# Patient Record
Sex: FEMALE | Race: WHITE | Employment: OTHER | URBAN - METROPOLITAN AREA
[De-identification: names, ages, dates, MRNs, and addresses within clinical notes are randomized per-mention and may not be internally consistent; named-entity substitution may affect disease eponyms.]

---

## 2017-12-05 PROBLEM — O09.523 ELDERLY MULTIGRAVIDA IN THIRD TRIMESTER: Status: ACTIVE | Noted: 2017-12-05

## 2017-12-21 ENCOUNTER — TELEPHONE (OUTPATIENT)
Dept: PRIMARY CARE CLINIC | Age: 37
End: 2017-12-21

## 2018-02-09 RX ORDER — VALACYCLOVIR HYDROCHLORIDE 1 G/1
1000 TABLET, FILM COATED ORAL DAILY
Qty: 30 TABLET | Refills: 12 | Status: ON HOLD | OUTPATIENT
Start: 2018-02-09 | End: 2022-01-14

## 2018-11-06 LAB
C. TRACHOMATIS, EXTERNAL RESULT: NEGATIVE
N. GONORRHOEAE, EXTERNAL RESULT: NEGATIVE

## 2018-12-04 LAB
ABO, EXTERNAL RESULT: NORMAL
HEP B, EXTERNAL RESULT: NEGATIVE
HIV, EXTERNAL RESULT: NORMAL
RHOGAM, EXTERNAL RESULT: POSITIVE
RPR, EXTERNAL RESULT: NEGATIVE
RUBELLA TITER, EXTERNAL RESULT: NORMAL

## 2019-05-14 LAB — GBS, EXTERNAL RESULT: NEGATIVE

## 2019-06-11 ENCOUNTER — HOSPITAL ENCOUNTER (INPATIENT)
Age: 39
LOS: 1 days | Discharge: HOME | End: 2019-06-13
Attending: OBSTETRICS & GYNECOLOGY | Admitting: OBSTETRICS & GYNECOLOGY

## 2019-06-11 ENCOUNTER — APPOINTMENT (OUTPATIENT)
Dept: LABOR AND DELIVERY | Age: 39
End: 2019-06-11

## 2019-06-11 LAB
AMPHETAMINE SCREEN, URINE: NORMAL
BARBITURATE SCREEN URINE: NORMAL
BASOPHILS ABSOLUTE: 0 K/UL (ref 0–0.2)
BASOPHILS RELATIVE PERCENT: 0.3 %
BENZODIAZEPINE SCREEN, URINE: NORMAL
BUPRENORPHINE URINE: NORMAL
CANNABINOID SCREEN URINE: NORMAL
COCAINE METABOLITE SCREEN URINE: NORMAL
EOSINOPHILS ABSOLUTE: 0.1 K/UL (ref 0–0.6)
EOSINOPHILS RELATIVE PERCENT: 0.5 %
HCT VFR BLD CALC: 36.9 % (ref 36–48)
HEMOGLOBIN: 12.7 G/DL (ref 12–16)
LYMPHOCYTES ABSOLUTE: 1.8 K/UL (ref 1–5.1)
LYMPHOCYTES RELATIVE PERCENT: 14.3 %
Lab: NORMAL
MCH RBC QN AUTO: 30.7 PG (ref 26–34)
MCHC RBC AUTO-ENTMCNC: 34.4 G/DL (ref 31–36)
MCV RBC AUTO: 89.3 FL (ref 80–100)
METHADONE SCREEN, URINE: NORMAL
MONOCYTES ABSOLUTE: 0.8 K/UL (ref 0–1.3)
MONOCYTES RELATIVE PERCENT: 6.3 %
NEUTROPHILS ABSOLUTE: 10 K/UL (ref 1.7–7.7)
NEUTROPHILS RELATIVE PERCENT: 78.6 %
OPIATE SCREEN URINE: NORMAL
OXYCODONE URINE: NORMAL
PDW BLD-RTO: 15.1 % (ref 12.4–15.4)
PH UA: 7
PHENCYCLIDINE SCREEN URINE: NORMAL
PLATELET # BLD: 180 K/UL (ref 135–450)
PMV BLD AUTO: 10.4 FL (ref 5–10.5)
PROPOXYPHENE SCREEN: NORMAL
RBC # BLD: 4.13 M/UL (ref 4–5.2)
SPECIMEN STATUS: NORMAL
WBC # BLD: 12.7 K/UL (ref 4–11)

## 2019-06-11 PROCEDURE — 86780 TREPONEMA PALLIDUM: CPT

## 2019-06-11 PROCEDURE — 80307 DRUG TEST PRSMV CHEM ANLYZR: CPT

## 2019-06-11 PROCEDURE — 6370000000 HC RX 637 (ALT 250 FOR IP): Performed by: OBSTETRICS & GYNECOLOGY

## 2019-06-11 PROCEDURE — 85025 COMPLETE CBC W/AUTO DIFF WBC: CPT

## 2019-06-11 RX ORDER — SODIUM CHLORIDE, SODIUM LACTATE, POTASSIUM CHLORIDE, CALCIUM CHLORIDE 600; 310; 30; 20 MG/100ML; MG/100ML; MG/100ML; MG/100ML
INJECTION, SOLUTION INTRAVENOUS CONTINUOUS
Status: DISCONTINUED | OUTPATIENT
Start: 2019-06-11 | End: 2019-06-12

## 2019-06-11 RX ORDER — DIPHENHYDRAMINE HYDROCHLORIDE 50 MG/ML
25 INJECTION INTRAMUSCULAR; INTRAVENOUS EVERY 4 HOURS PRN
Status: DISCONTINUED | OUTPATIENT
Start: 2019-06-11 | End: 2019-06-12

## 2019-06-11 RX ORDER — ONDANSETRON 2 MG/ML
4 INJECTION INTRAMUSCULAR; INTRAVENOUS EVERY 6 HOURS PRN
Status: DISCONTINUED | OUTPATIENT
Start: 2019-06-11 | End: 2019-06-12

## 2019-06-11 RX ORDER — SODIUM CHLORIDE 0.9 % (FLUSH) 0.9 %
10 SYRINGE (ML) INJECTION PRN
Status: DISCONTINUED | OUTPATIENT
Start: 2019-06-11 | End: 2019-06-12

## 2019-06-11 RX ORDER — SODIUM CHLORIDE 0.9 % (FLUSH) 0.9 %
10 SYRINGE (ML) INJECTION EVERY 12 HOURS SCHEDULED
Status: DISCONTINUED | OUTPATIENT
Start: 2019-06-11 | End: 2019-06-12

## 2019-06-11 RX ORDER — LIDOCAINE HYDROCHLORIDE 10 MG/ML
30 INJECTION, SOLUTION EPIDURAL; INFILTRATION; INTRACAUDAL; PERINEURAL PRN
Status: DISCONTINUED | OUTPATIENT
Start: 2019-06-11 | End: 2019-06-12

## 2019-06-11 RX ADMIN — DINOPROSTONE 10 MG: 10 INSERT VAGINAL at 19:39

## 2019-06-12 ENCOUNTER — ANESTHESIA (OUTPATIENT)
Dept: LABOR AND DELIVERY | Age: 39
End: 2019-06-12

## 2019-06-12 ENCOUNTER — ANESTHESIA EVENT (OUTPATIENT)
Dept: LABOR AND DELIVERY | Age: 39
End: 2019-06-12

## 2019-06-12 PROBLEM — Z3A.39 39 WEEKS GESTATION OF PREGNANCY: Status: ACTIVE | Noted: 2019-06-12

## 2019-06-12 PROBLEM — Z34.90 TERM PREGNANCY: Status: ACTIVE | Noted: 2019-06-12

## 2019-06-12 LAB — TOTAL SYPHILLIS IGG/IGM: NORMAL

## 2019-06-12 PROCEDURE — 6370000000 HC RX 637 (ALT 250 FOR IP): Performed by: OBSTETRICS & GYNECOLOGY

## 2019-06-12 PROCEDURE — 1220000000 HC SEMI PRIVATE OB R&B

## 2019-06-12 PROCEDURE — 3700000025 EPIDURAL BLOCK: Performed by: ANESTHESIOLOGY

## 2019-06-12 PROCEDURE — 3E0P7VZ INTRODUCTION OF HORMONE INTO FEMALE REPRODUCTIVE, VIA NATURAL OR ARTIFICIAL OPENING: ICD-10-PCS | Performed by: OBSTETRICS & GYNECOLOGY

## 2019-06-12 PROCEDURE — 2580000003 HC RX 258: Performed by: OBSTETRICS & GYNECOLOGY

## 2019-06-12 PROCEDURE — 2500000003 HC RX 250 WO HCPCS: Performed by: NURSE ANESTHETIST, CERTIFIED REGISTERED

## 2019-06-12 PROCEDURE — 51701 INSERT BLADDER CATHETER: CPT

## 2019-06-12 PROCEDURE — 7200000001 HC VAGINAL DELIVERY

## 2019-06-12 RX ORDER — KETOROLAC TROMETHAMINE 30 MG/ML
30 INJECTION, SOLUTION INTRAMUSCULAR; INTRAVENOUS EVERY 6 HOURS
Status: ACTIVE | OUTPATIENT
Start: 2019-06-12 | End: 2019-06-12

## 2019-06-12 RX ORDER — ACETAMINOPHEN 325 MG/1
650 TABLET ORAL EVERY 4 HOURS PRN
Status: DISCONTINUED | OUTPATIENT
Start: 2019-06-12 | End: 2019-06-13 | Stop reason: HOSPADM

## 2019-06-12 RX ORDER — LANOLIN 100 %
OINTMENT (GRAM) TOPICAL PRN
Status: DISCONTINUED | OUTPATIENT
Start: 2019-06-12 | End: 2019-06-13 | Stop reason: HOSPADM

## 2019-06-12 RX ORDER — SODIUM CHLORIDE, SODIUM LACTATE, POTASSIUM CHLORIDE, CALCIUM CHLORIDE 600; 310; 30; 20 MG/100ML; MG/100ML; MG/100ML; MG/100ML
INJECTION, SOLUTION INTRAVENOUS CONTINUOUS
Status: DISCONTINUED | OUTPATIENT
Start: 2019-06-12 | End: 2019-06-13 | Stop reason: HOSPADM

## 2019-06-12 RX ORDER — DOCUSATE SODIUM 100 MG/1
100 CAPSULE, LIQUID FILLED ORAL 2 TIMES DAILY PRN
Status: DISCONTINUED | OUTPATIENT
Start: 2019-06-12 | End: 2019-06-13 | Stop reason: HOSPADM

## 2019-06-12 RX ORDER — BUPIVACAINE HYDROCHLORIDE 2.5 MG/ML
INJECTION, SOLUTION EPIDURAL; INFILTRATION; INTRACAUDAL PRN
Status: DISCONTINUED | OUTPATIENT
Start: 2019-06-12 | End: 2019-06-13 | Stop reason: SDUPTHER

## 2019-06-12 RX ORDER — ONDANSETRON 2 MG/ML
4 INJECTION INTRAMUSCULAR; INTRAVENOUS EVERY 6 HOURS PRN
Status: DISCONTINUED | OUTPATIENT
Start: 2019-06-12 | End: 2019-06-13 | Stop reason: HOSPADM

## 2019-06-12 RX ORDER — IBUPROFEN 800 MG/1
800 TABLET ORAL EVERY 8 HOURS PRN
Status: DISCONTINUED | OUTPATIENT
Start: 2019-06-12 | End: 2019-06-13 | Stop reason: HOSPADM

## 2019-06-12 RX ORDER — SODIUM CHLORIDE 0.9 % (FLUSH) 0.9 %
10 SYRINGE (ML) INJECTION PRN
Status: DISCONTINUED | OUTPATIENT
Start: 2019-06-12 | End: 2019-06-13 | Stop reason: HOSPADM

## 2019-06-12 RX ORDER — FERROUS SULFATE 325(65) MG
325 TABLET ORAL 2 TIMES DAILY WITH MEALS
Status: DISCONTINUED | OUTPATIENT
Start: 2019-06-12 | End: 2019-06-13 | Stop reason: HOSPADM

## 2019-06-12 RX ORDER — SIMETHICONE 80 MG
80 TABLET,CHEWABLE ORAL EVERY 6 HOURS PRN
Status: DISCONTINUED | OUTPATIENT
Start: 2019-06-12 | End: 2019-06-13 | Stop reason: HOSPADM

## 2019-06-12 RX ORDER — SODIUM CHLORIDE 0.9 % (FLUSH) 0.9 %
10 SYRINGE (ML) INJECTION EVERY 12 HOURS SCHEDULED
Status: DISCONTINUED | OUTPATIENT
Start: 2019-06-12 | End: 2019-06-13 | Stop reason: HOSPADM

## 2019-06-12 RX ADMIN — BUPIVACAINE HYDROCHLORIDE 8 ML: 2.5 INJECTION, SOLUTION EPIDURAL; INFILTRATION; INTRACAUDAL; PERINEURAL at 05:07

## 2019-06-12 RX ADMIN — SODIUM CHLORIDE, POTASSIUM CHLORIDE, SODIUM LACTATE AND CALCIUM CHLORIDE: 600; 310; 30; 20 INJECTION, SOLUTION INTRAVENOUS at 05:23

## 2019-06-12 RX ADMIN — WITCH HAZEL 40 EACH: 500 SOLUTION RECTAL; TOPICAL at 14:43

## 2019-06-12 RX ADMIN — IBUPROFEN 800 MG: 800 TABLET, FILM COATED ORAL at 20:10

## 2019-06-12 RX ADMIN — Medication 15 ML/HR: at 05:12

## 2019-06-12 RX ADMIN — BENZOCAINE AND LEVOMENTHOL: 200; 5 SPRAY TOPICAL at 14:43

## 2019-06-12 RX ADMIN — DOCUSATE SODIUM 100 MG: 100 CAPSULE, LIQUID FILLED ORAL at 17:01

## 2019-06-12 RX ADMIN — HYDROCORTISONE: 25 CREAM TOPICAL at 14:43

## 2019-06-12 ASSESSMENT — PAIN SCALES - GENERAL: PAINLEVEL_OUTOF10: 2

## 2019-06-12 NOTE — H&P
Department of Obstetrics and Gynecology  Labor and Delivery   Attending Progress Note      SUBJECTIVE:  Patient admitted for induction of labor      OBJECTIVE:      Fetal heart rate:       Baseline Heart Rate:  130        Accelerations:  present       Long Term Variability:  moderate       Decelerations:  absent         Contraction frequency: 3 minutes    Membranes:  Intact    Cervix:         Dilation:  8 cm         Effacement:  80%         Station:  0         Position:  mid             ASSESSMENT & PLAN:    43 y.o.    OB History        8    Para   5    Term   5       0    AB   2    Living   5       SAB   2    TAB   0    Ectopic   0    Molar   0    Multiple   0    Live Births   5             39w6d  1. FWB: reassuring  2. Cervidil pulled at 4:40 am.  Continued with cervical change  3. ACOG reviewed. GBS -, A+, surrogate.

## 2019-06-12 NOTE — ANESTHESIA PROCEDURE NOTES
Epidural Block    Patient location during procedure: OB  Reason for block: labor epidural  Staffing  Resident/CRNA: Jonathan Guerrero, APRN - CRNA  Preanesthetic Checklist  Completed: patient identified, pre-op evaluation, timeout performed, IV checked, risks and benefits discussed, monitors and equipment checked, anesthesia consent given, oxygen available and patient being monitored  Epidural  Patient position: sitting  Prep: ChloraPrep  Patient monitoring: continuous pulse ox  Approach: midline  Location: lumbar (1-5)  Injection technique: JAXON saline  Provider prep: mask  Needle  Needle type: Tuohy   Needle gauge: 17 G  Needle length: 3.5 in  Needle insertion depth: 4 cm  Catheter type: side hole  Catheter size: 19 G  Catheter at skin depth: 10 cm  Test dose: negative  Assessment  Sensory level: T8  Hemodynamics: stable  Attempts: 1  Additional Notes  Pt prepped and draped in sterile fashion. Skin wheal with 1% lidocaine. JAXON with 3 cc NS, 25g spinal needle inserted per lila. Clear CSF visualized in hub. Needle withdrawn and catheter threaded. Negative test dose 3cc 1.5% Lidocaine with Epinephrine. Sterile dressing applied.

## 2019-06-12 NOTE — PROGRESS NOTES
Department of Obstetrics and Gynecology  Labor and Delivery   Attending Progress Note      SUBJECTIVE:  Patient comfortable with epidural s/p SROM    OBJECTIVE:      Fetal heart rate: Cat 1            Contraction frequency: 3 minutes    Membranes:  Ruptured clear fluid    Cervix:         Dilation:  Anterior lip         Effacement:  100%         Station:  -2         Position:  mid             ASSESSMENT & PLAN:    43 y.o.    OB History        8    Para   5    Term   5       0    AB   2    Living   5       SAB   2    TAB   0    Ectopic   0    Molar   0    Multiple   0    Live Births   5             39w6d  1. FWB: reassuring  2. Expect . Will allow to labor down.

## 2019-06-12 NOTE — PROGRESS NOTES
Called Dr. Toya Fiore to update that patient is uncomfortable with ctx and last vaginal exam was 2/50/-2

## 2019-06-12 NOTE — PLAN OF CARE
Problem: Anxiety:  Goal: Level of anxiety will decrease  Description  Level of anxiety will decrease  Outcome: Ongoing     Problem: Breathing Pattern - Ineffective:  Goal: Able to breathe comfortably  Description  Able to breathe comfortably  Outcome: Ongoing     Problem: Fluid Volume - Imbalance:  Goal: Absence of imbalanced fluid volume signs and symptoms  Description  Absence of imbalanced fluid volume signs and symptoms  Outcome: Ongoing  Goal: Absence of intrapartum hemorrhage signs and symptoms  Description  Absence of intrapartum hemorrhage signs and symptoms  Outcome: Ongoing     Problem: Infection - Intrapartum Infection:  Goal: Will show no infection signs and symptoms  Description  Will show no infection signs and symptoms  Outcome: Ongoing     Problem: Labor Process - Prolonged:  Goal: Labor progression, first stage, within specified pattern  Description  Labor progression, first stage, within specified pattern  Outcome: Ongoing  Goal: Labor progession, second stage, within specified pattern  Description  Labor progession, second stage, within specified pattern  Outcome: Ongoing  Goal: Uterine contractions within specified parameters  Description  Uterine contractions within specified parameters  Outcome: Ongoing     Problem:  Screening:  Goal: Ability to make informed decisions regarding treatment has improved  Description  Ability to make informed decisions regarding treatment has improved  Outcome: Ongoing     Problem: Pain - Acute:  Goal: Pain level will decrease  Description  Pain level will decrease  Outcome: Ongoing  Goal: Able to cope with pain  Description  Able to cope with pain  Outcome: Ongoing     Problem: Tissue Perfusion - Uteroplacental, Altered:  Goal: Absence of abnormal fetal heart rate pattern  Description  Absence of abnormal fetal heart rate pattern  Outcome: Ongoing     Problem: Urinary Retention:  Goal: Experiences of bladder distention will

## 2019-06-12 NOTE — ANESTHESIA PRE PROCEDURE
no meds    ASCUS on Pap smear 6/08    nl 12/08    IBS (irritable bowel syndrome)     Postpartum depression     last two pregnancies       Past Surgical History:        Procedure Laterality Date    DILATION AND CURETTAGE OF UTERUS  6/06    TONSILLECTOMY  1 y/o    WISDOM TOOTH EXTRACTION         Social History:    Social History     Tobacco Use    Smoking status: Never Smoker    Smokeless tobacco: Never Used   Substance Use Topics    Alcohol use: No                                Counseling given: Not Answered      Vital Signs (Current):   Vitals:    06/11/19 1856 06/11/19 1950 06/11/19 2339 06/12/19 0207   BP: 115/77 136/77 (!) 145/90 123/75   Pulse: 76 65 68 75   Resp: 16 16 16 16   Temp: 37.2 °C (98.9 °F) 36.9 °C (98.4 °F) 36.8 °C (98.2 °F) 36.8 °C (98.2 °F)   TempSrc: Oral Oral Oral Oral   Weight: 154 lb (69.9 kg)      Height: 5' (1.524 m)                                                 BP Readings from Last 3 Encounters:   06/12/19 123/75   12/07/17 114/64   09/23/16 106/60       NPO Status: Time of last liquid consumption: 1500                        Time of last solid consumption: 1500                        Date of last liquid consumption: 06/11/19                        Date of last solid food consumption: 06/11/19    BMI:   Wt Readings from Last 3 Encounters:   06/11/19 154 lb (69.9 kg)   12/04/17 152 lb (68.9 kg)   09/23/16 107 lb (48.5 kg)     Body mass index is 30.08 kg/m².     CBC:   Lab Results   Component Value Date    WBC 12.7 06/11/2019    RBC 4.13 06/11/2019    HGB 12.7 06/11/2019    HCT 36.9 06/11/2019    MCV 89.3 06/11/2019    RDW 15.1 06/11/2019     06/11/2019       CMP:   Lab Results   Component Value Date     01/18/2012    K 4.4 01/18/2012     01/18/2012    CO2 22 01/18/2012    BUN 15 01/18/2012    CREATININE 0.7 01/18/2012    GFRAA >60 01/18/2012    GLUCOSE 89 01/18/2012    CALCIUM 9.1 01/18/2012       POC Tests: No results for input(s): POCGLU, POCNA, POCK, POCCL, Asa He, POCHCT in the last 72 hours. Coags: No results found for: PROTIME, INR, APTT    HCG (If Applicable): No results found for: PREGTESTUR, PREGSERUM, HCG, HCGQUANT     ABGs: No results found for: PHART, PO2ART, CDB3AZR, QQM0WIA, BEART, Q4CRVUQI     Type & Screen (If Applicable):  No results found for: LABABO, [address]    Anesthesia Evaluation  Patient summary reviewed and Nursing notes reviewed no history of anesthetic complications:   Airway: Mallampati: II     Neck ROM: full   Dental: normal exam         Pulmonary:Negative Pulmonary ROS and normal exam                               Cardiovascular:Negative CV ROS                      Neuro/Psych:   Negative Neuro/Psych ROS  (+) depression/anxiety             GI/Hepatic/Renal: Neg GI/Hepatic/Renal ROS           ROS comment: IBS. Endo/Other: Negative Endo/Other ROS                    Abdominal:           Vascular:                                        Anesthesia Plan      epidural     ASA 2 - emergent     (I discussed with the patient the risks and benefits of labor epidural placement. All questions were answered the patient agrees with the plan. Recent Vitals:  ---------------------------               06/12/19                      0207         ---------------------------   BP:          123/75         Pulse:         75           Resp:          16           Temp:   36.8 °C (98.2 °F)  ---------------------------  Body mass index is 30.08 kg/m².     Social History    Tobacco Use      Smoking status: Never Smoker      Smokeless tobacco: Never Used      LABS:    CBC  Lab Results       Component                Value               Date/Time                  WBC                      12.7 (H)            06/11/2019 07:00 PM        HGB                      12.7                06/11/2019 07:00 PM        HCT                      36.9                06/11/2019 07:00 PM        PLT                      180                 06/11/2019 07:00 PM   RENAL  Lab Results       Component                Value               Date/Time                  NA                       140                 01/18/2012 11:10 AM        K                        4.4                 01/18/2012 11:10 AM        CL                       108                 01/18/2012 11:10 AM        CO2                      22                  01/18/2012 11:10 AM        BUN                      15                  01/18/2012 11:10 AM        CREATININE               0.7                 01/18/2012 11:10 AM        GLUCOSE                  89                  01/18/2012 11:10 AM   COAGS  No results found for: PROTIME, INR, APTT)        Anesthetic plan and risks discussed with patient.                       AARON Andrade - CRNA   6/12/2019

## 2019-06-12 NOTE — BRIEF OP NOTE
Brief Postoperative Note  ______________________________________________________________    Patient: Wilbur Cabral  YOB: 1980  MRN: [id number]  Date of Procedure:     Surrogate pregnancy. AMA  IOL  , VMI  Apgars 8/9  No epis/lac. Placenta spon.   cc      Adalid Landry MD  Date: 2019  Time: 10:30 AM

## 2019-06-13 VITALS
BODY MASS INDEX: 30.23 KG/M2 | HEART RATE: 67 BPM | WEIGHT: 154 LBS | TEMPERATURE: 96.7 F | HEIGHT: 60 IN | RESPIRATION RATE: 16 BRPM | SYSTOLIC BLOOD PRESSURE: 143 MMHG | DIASTOLIC BLOOD PRESSURE: 80 MMHG

## 2019-06-13 LAB
HCT VFR BLD CALC: 33.9 % (ref 36–48)
HEMOGLOBIN: 11.6 G/DL (ref 12–16)
MCH RBC QN AUTO: 31.1 PG (ref 26–34)
MCHC RBC AUTO-ENTMCNC: 34.2 G/DL (ref 31–36)
MCV RBC AUTO: 90.8 FL (ref 80–100)
PDW BLD-RTO: 15.4 % (ref 12.4–15.4)
PLATELET # BLD: 131 K/UL (ref 135–450)
PMV BLD AUTO: 10.2 FL (ref 5–10.5)
RBC # BLD: 3.74 M/UL (ref 4–5.2)
WBC # BLD: 11.4 K/UL (ref 4–11)

## 2019-06-13 PROCEDURE — 36415 COLL VENOUS BLD VENIPUNCTURE: CPT

## 2019-06-13 PROCEDURE — 85027 COMPLETE CBC AUTOMATED: CPT

## 2019-06-13 PROCEDURE — 6370000000 HC RX 637 (ALT 250 FOR IP): Performed by: OBSTETRICS & GYNECOLOGY

## 2019-06-13 RX ORDER — IBUPROFEN 800 MG/1
800 TABLET ORAL EVERY 8 HOURS PRN
Qty: 120 TABLET | Refills: 3 | Status: ON HOLD | COMMUNITY
Start: 2019-06-13 | End: 2022-01-14

## 2019-06-13 RX ADMIN — HYDROCORTISONE: 25 CREAM TOPICAL at 09:36

## 2019-06-13 RX ADMIN — IBUPROFEN 800 MG: 800 TABLET, FILM COATED ORAL at 09:36

## 2019-06-13 RX ADMIN — WITCH HAZEL 40 EACH: 500 SOLUTION RECTAL; TOPICAL at 09:36

## 2019-06-13 RX ADMIN — DOCUSATE SODIUM 100 MG: 100 CAPSULE, LIQUID FILLED ORAL at 09:37

## 2019-06-13 RX ADMIN — BENZOCAINE AND LEVOMENTHOL: 200; 5 SPRAY TOPICAL at 09:36

## 2019-06-13 ASSESSMENT — PAIN SCALES - GENERAL: PAINLEVEL_OUTOF10: 1

## 2019-06-13 NOTE — FLOWSHEET NOTE
Discharge Phone Call Log  Patient Name: Altamese Brain     [facility] Care Provider: Houston Reyes MD Discharge Date: 6/13/2019    Disposition of baby:  Vale Steiner  Phone Number: [phone] (home)     Attempts to Contact:  Date:    Nurse  Date:    Nurse  Date:    Nurse    Introduce yourself and explain the questionnaire. 1.  Now that you are at home is your pain being well controlled? Y/N   What pain reducing measures are you using? ____________________________________      This patient has no babies on file. 2. Are you having any infant feeding issues? Y/N _____________________________      If breastfeeding, were you satisfied with the breastfeeding support services offered? Y/N  3. Any engorgement problems? Y/N  Have you had to supplement? Y/N  4. Have you made or have you already had your first appointment with the baby's doctor? Y/N If no, do you know when to schedule it? Y/N Do you have a safe crib, bassinet or play yard with a firm mattress for your infant to sleep in at home? Y/N  5. Have you scheduled your follow-up appointment? Y/N  If no, do you know when to schedule it? Y/N  6. Did your nurses and physicians include you in the plan of care, communicating with      you respectfully, and in a manner easy to understand? Y/N       Comments:  ___________________________________________________________  7. Is there anyone in particular you would like to mention who provided care for you?          ____________________________    8. Do you have any questions about your discharge instructions or the notebook? Y/N    9. Did your discharge occur in a timely manner? Y/N        Comments: __________________________________________________________    Remember to describe the hospital survey and ask patient to return it when possible.   Questions During Interview:__________________________________________________________  Teaching During interview :___________________________________________________________  ___________________________RN       Date:______________Time:________________

## 2019-06-18 NOTE — ANESTHESIA POSTPROCEDURE EVALUATION
Department of Anesthesiology  Postprocedure Note    Patient: Cleophus Cabot  MRN: [id number]  YOB: 1980  Date of evaluation: 6/18/2019  Time:  7:35 AM     Procedure Summary     Date:  06/12/19 Room / Location:      Anesthesia Start:  0454 Anesthesia Stop:  1025    Procedure:  Labor Analgesia Diagnosis:      Scheduled Providers:   Responsible Provider:  Eddie Hathaway MD    Anesthesia Type:  epidural ASA Status:  2 - Emergent          Anesthesia Type: epidural    Joby Phase I:      Joby Phase II:      Last vitals: Reviewed and per EMR flowsheets. Anesthesia Post Evaluation    Comments: Pt has already been discharged.   Upon review of chart it appears that there are no apparent anesthesia complications

## 2021-12-06 ENCOUNTER — HOSPITAL ENCOUNTER (OUTPATIENT)
Dept: ULTRASOUND IMAGING | Age: 41
Discharge: HOME | End: 2021-12-06
Payer: COMMERCIAL

## 2021-12-06 DIAGNOSIS — O09.523 MULTIGRAVIDA OF ADVANCED MATERNAL AGE IN THIRD TRIMESTER: ICD-10-CM

## 2021-12-06 PROCEDURE — 76819 FETAL BIOPHYS PROFIL W/O NST: CPT

## 2022-01-10 ENCOUNTER — HOSPITAL ENCOUNTER (OUTPATIENT)
Dept: ULTRASOUND IMAGING | Age: 42
Discharge: HOME | End: 2022-01-10
Payer: COMMERCIAL

## 2022-01-10 ENCOUNTER — HOSPITAL ENCOUNTER (OUTPATIENT)
Age: 42
Discharge: HOME | End: 2022-01-10
Payer: COMMERCIAL

## 2022-01-10 DIAGNOSIS — O09.529 ANTEPARTUM MULTIGRAVIDA OF ADVANCED MATERNAL AGE: ICD-10-CM

## 2022-01-10 LAB — SARS-COV-2: DETECTED

## 2022-01-10 PROCEDURE — U0005 INFEC AGEN DETEC AMPLI PROBE: HCPCS

## 2022-01-10 PROCEDURE — U0003 INFECTIOUS AGENT DETECTION BY NUCLEIC ACID (DNA OR RNA); SEVERE ACUTE RESPIRATORY SYNDROME CORONAVIRUS 2 (SARS-COV-2) (CORONAVIRUS DISEASE [COVID-19]), AMPLIFIED PROBE TECHNIQUE, MAKING USE OF HIGH THROUGHPUT TECHNOLOGIES AS DESCRIBED BY CMS-2020-01-R: HCPCS

## 2022-01-10 PROCEDURE — 76819 FETAL BIOPHYS PROFIL W/O NST: CPT

## 2022-01-14 ENCOUNTER — HOSPITAL ENCOUNTER (INPATIENT)
Age: 42
LOS: 1 days | Discharge: HOME | End: 2022-01-15
Attending: OBSTETRICS & GYNECOLOGY | Admitting: OBSTETRICS & GYNECOLOGY
Payer: COMMERCIAL

## 2022-01-14 ENCOUNTER — APPOINTMENT (OUTPATIENT)
Dept: LABOR AND DELIVERY | Age: 42
End: 2022-01-14
Payer: COMMERCIAL

## 2022-01-14 ENCOUNTER — ANESTHESIA EVENT (OUTPATIENT)
Dept: LABOR AND DELIVERY | Age: 42
End: 2022-01-14
Payer: COMMERCIAL

## 2022-01-14 ENCOUNTER — ANESTHESIA (OUTPATIENT)
Dept: LABOR AND DELIVERY | Age: 42
End: 2022-01-14
Payer: COMMERCIAL

## 2022-01-14 PROBLEM — Z37.9 NORMAL LABOR: Status: ACTIVE | Noted: 2022-01-14

## 2022-01-14 LAB
ABO/RH: NORMAL
AMPHETAMINE SCREEN, URINE: NORMAL
ANTIBODY SCREEN: NORMAL
BARBITURATE SCREEN URINE: NORMAL
BASOPHILS ABSOLUTE: 0 K/UL (ref 0–0.2)
BASOPHILS RELATIVE PERCENT: 0.5 %
BENZODIAZEPINE SCREEN, URINE: NORMAL
BUPRENORPHINE URINE: NORMAL
CANNABINOID SCREEN URINE: NORMAL
COCAINE METABOLITE SCREEN URINE: NORMAL
EOSINOPHILS ABSOLUTE: 0.1 K/UL (ref 0–0.6)
EOSINOPHILS RELATIVE PERCENT: 0.9 %
HCT VFR BLD CALC: 34.6 % (ref 36–48)
HEMOGLOBIN: 11.5 G/DL (ref 12–16)
LYMPHOCYTES ABSOLUTE: 2.2 K/UL (ref 1–5.1)
LYMPHOCYTES RELATIVE PERCENT: 27.1 %
Lab: NORMAL
MCH RBC QN AUTO: 27.9 PG (ref 26–34)
MCHC RBC AUTO-ENTMCNC: 33.1 G/DL (ref 31–36)
MCV RBC AUTO: 84 FL (ref 80–100)
METHADONE SCREEN, URINE: NORMAL
MONOCYTES ABSOLUTE: 0.6 K/UL (ref 0–1.3)
MONOCYTES RELATIVE PERCENT: 7 %
NEUTROPHILS ABSOLUTE: 5.2 K/UL (ref 1.7–7.7)
NEUTROPHILS RELATIVE PERCENT: 64.5 %
OPIATE SCREEN URINE: NORMAL
OXYCODONE URINE: NORMAL
PDW BLD-RTO: 14.6 % (ref 12.4–15.4)
PH UA: 6
PHENCYCLIDINE SCREEN URINE: NORMAL
PLATELET # BLD: 238 K/UL (ref 135–450)
PMV BLD AUTO: 9.3 FL (ref 5–10.5)
PROPOXYPHENE SCREEN: NORMAL
RBC # BLD: 4.11 M/UL (ref 4–5.2)
WBC # BLD: 8 K/UL (ref 4–11)

## 2022-01-14 PROCEDURE — 3E033VJ INTRODUCTION OF OTHER HORMONE INTO PERIPHERAL VEIN, PERCUTANEOUS APPROACH: ICD-10-PCS | Performed by: STUDENT IN AN ORGANIZED HEALTH CARE EDUCATION/TRAINING PROGRAM

## 2022-01-14 PROCEDURE — 86850 RBC ANTIBODY SCREEN: CPT

## 2022-01-14 PROCEDURE — 10907ZC DRAINAGE OF AMNIOTIC FLUID, THERAPEUTIC FROM PRODUCTS OF CONCEPTION, VIA NATURAL OR ARTIFICIAL OPENING: ICD-10-PCS | Performed by: STUDENT IN AN ORGANIZED HEALTH CARE EDUCATION/TRAINING PROGRAM

## 2022-01-14 PROCEDURE — 10D17Z9 MANUAL EXTRACTION OF PRODUCTS OF CONCEPTION, RETAINED, VIA NATURAL OR ARTIFICIAL OPENING: ICD-10-PCS | Performed by: STUDENT IN AN ORGANIZED HEALTH CARE EDUCATION/TRAINING PROGRAM

## 2022-01-14 PROCEDURE — 3700000025 EPIDURAL BLOCK: Performed by: ANESTHESIOLOGY

## 2022-01-14 PROCEDURE — 86900 BLOOD TYPING SEROLOGIC ABO: CPT

## 2022-01-14 PROCEDURE — 80307 DRUG TEST PRSMV CHEM ANLYZR: CPT

## 2022-01-14 PROCEDURE — 86901 BLOOD TYPING SEROLOGIC RH(D): CPT

## 2022-01-14 PROCEDURE — 85025 COMPLETE CBC W/AUTO DIFF WBC: CPT

## 2022-01-14 PROCEDURE — 1220000000 HC SEMI PRIVATE OB R&B

## 2022-01-14 PROCEDURE — 2580000003 HC RX 258: Performed by: STUDENT IN AN ORGANIZED HEALTH CARE EDUCATION/TRAINING PROGRAM

## 2022-01-14 PROCEDURE — 2500000003 HC RX 250 WO HCPCS: Performed by: NURSE ANESTHETIST, CERTIFIED REGISTERED

## 2022-01-14 PROCEDURE — 86780 TREPONEMA PALLIDUM: CPT

## 2022-01-14 PROCEDURE — 6360000002 HC RX W HCPCS: Performed by: STUDENT IN AN ORGANIZED HEALTH CARE EDUCATION/TRAINING PROGRAM

## 2022-01-14 PROCEDURE — 7200000001 HC VAGINAL DELIVERY

## 2022-01-14 RX ORDER — SODIUM CHLORIDE 0.9 % (FLUSH) 0.9 %
5-40 SYRINGE (ML) INJECTION EVERY 12 HOURS SCHEDULED
Status: DISCONTINUED | OUTPATIENT
Start: 2022-01-15 | End: 2022-01-15 | Stop reason: HOSPADM

## 2022-01-14 RX ORDER — TERBUTALINE SULFATE 1 MG/ML
0.25 INJECTION, SOLUTION SUBCUTANEOUS ONCE
Status: DISCONTINUED | OUTPATIENT
Start: 2022-01-14 | End: 2022-01-15 | Stop reason: HOSPADM

## 2022-01-14 RX ORDER — MISOPROSTOL 100 UG/1
800 TABLET ORAL PRN
Status: DISCONTINUED | OUTPATIENT
Start: 2022-01-14 | End: 2022-01-15 | Stop reason: HOSPADM

## 2022-01-14 RX ORDER — SODIUM CHLORIDE, SODIUM LACTATE, POTASSIUM CHLORIDE, AND CALCIUM CHLORIDE .6; .31; .03; .02 G/100ML; G/100ML; G/100ML; G/100ML
1000 INJECTION, SOLUTION INTRAVENOUS PRN
Status: DISCONTINUED | OUTPATIENT
Start: 2022-01-14 | End: 2022-01-15 | Stop reason: HOSPADM

## 2022-01-14 RX ORDER — SODIUM CHLORIDE 0.9 % (FLUSH) 0.9 %
5-40 SYRINGE (ML) INJECTION PRN
Status: DISCONTINUED | OUTPATIENT
Start: 2022-01-14 | End: 2022-01-15 | Stop reason: HOSPADM

## 2022-01-14 RX ORDER — SODIUM CHLORIDE 9 MG/ML
25 INJECTION, SOLUTION INTRAVENOUS PRN
Status: DISCONTINUED | OUTPATIENT
Start: 2022-01-14 | End: 2022-01-15 | Stop reason: HOSPADM

## 2022-01-14 RX ORDER — SODIUM CHLORIDE, SODIUM LACTATE, POTASSIUM CHLORIDE, CALCIUM CHLORIDE 600; 310; 30; 20 MG/100ML; MG/100ML; MG/100ML; MG/100ML
INJECTION, SOLUTION INTRAVENOUS CONTINUOUS
Status: DISCONTINUED | OUTPATIENT
Start: 2022-01-15 | End: 2022-01-15 | Stop reason: HOSPADM

## 2022-01-14 RX ORDER — BUPIVACAINE HYDROCHLORIDE 2.5 MG/ML
INJECTION, SOLUTION EPIDURAL; INFILTRATION; INTRACAUDAL PRN
Status: DISCONTINUED | OUTPATIENT
Start: 2022-01-14 | End: 2022-01-14 | Stop reason: SDUPTHER

## 2022-01-14 RX ORDER — FERROUS SULFATE 325(65) MG
325 TABLET ORAL 2 TIMES DAILY WITH MEALS
Status: DISCONTINUED | OUTPATIENT
Start: 2022-01-15 | End: 2022-01-15 | Stop reason: HOSPADM

## 2022-01-14 RX ORDER — CARBOPROST TROMETHAMINE 250 UG/ML
250 INJECTION, SOLUTION INTRAMUSCULAR PRN
Status: DISCONTINUED | OUTPATIENT
Start: 2022-01-14 | End: 2022-01-15 | Stop reason: HOSPADM

## 2022-01-14 RX ORDER — CLINDAMYCIN PHOSPHATE 900 MG/50ML
900 INJECTION INTRAVENOUS ONCE
Status: COMPLETED | OUTPATIENT
Start: 2022-01-15 | End: 2022-01-15

## 2022-01-14 RX ORDER — ONDANSETRON 2 MG/ML
4 INJECTION INTRAMUSCULAR; INTRAVENOUS EVERY 6 HOURS PRN
Status: DISCONTINUED | OUTPATIENT
Start: 2022-01-14 | End: 2022-01-15 | Stop reason: HOSPADM

## 2022-01-14 RX ORDER — SODIUM CHLORIDE, SODIUM LACTATE, POTASSIUM CHLORIDE, AND CALCIUM CHLORIDE .6; .31; .03; .02 G/100ML; G/100ML; G/100ML; G/100ML
500 INJECTION, SOLUTION INTRAVENOUS PRN
Status: DISCONTINUED | OUTPATIENT
Start: 2022-01-14 | End: 2022-01-15 | Stop reason: HOSPADM

## 2022-01-14 RX ORDER — DOCUSATE SODIUM 100 MG/1
100 CAPSULE, LIQUID FILLED ORAL 2 TIMES DAILY
Status: DISCONTINUED | OUTPATIENT
Start: 2022-01-15 | End: 2022-01-15 | Stop reason: HOSPADM

## 2022-01-14 RX ORDER — METHYLERGONOVINE MALEATE 0.2 MG/ML
200 INJECTION INTRAVENOUS PRN
Status: DISCONTINUED | OUTPATIENT
Start: 2022-01-14 | End: 2022-01-15 | Stop reason: HOSPADM

## 2022-01-14 RX ORDER — SODIUM CHLORIDE 0.9 % (FLUSH) 0.9 %
5-40 SYRINGE (ML) INJECTION EVERY 12 HOURS SCHEDULED
Status: DISCONTINUED | OUTPATIENT
Start: 2022-01-14 | End: 2022-01-15 | Stop reason: HOSPADM

## 2022-01-14 RX ORDER — SODIUM CHLORIDE, SODIUM LACTATE, POTASSIUM CHLORIDE, CALCIUM CHLORIDE 600; 310; 30; 20 MG/100ML; MG/100ML; MG/100ML; MG/100ML
INJECTION, SOLUTION INTRAVENOUS CONTINUOUS
Status: DISCONTINUED | OUTPATIENT
Start: 2022-01-14 | End: 2022-01-15 | Stop reason: HOSPADM

## 2022-01-14 RX ORDER — ACETAMINOPHEN 325 MG/1
650 TABLET ORAL EVERY 4 HOURS PRN
Status: DISCONTINUED | OUTPATIENT
Start: 2022-01-14 | End: 2022-01-15 | Stop reason: HOSPADM

## 2022-01-14 RX ORDER — BUTORPHANOL TARTRATE 1 MG/ML
1 INJECTION, SOLUTION INTRAMUSCULAR; INTRAVENOUS
Status: DISCONTINUED | OUTPATIENT
Start: 2022-01-14 | End: 2022-01-15 | Stop reason: HOSPADM

## 2022-01-14 RX ORDER — BUPIVACAINE HYDROCHLORIDE 5 MG/ML
INJECTION, SOLUTION EPIDURAL; INTRACAUDAL PRN
Status: DISCONTINUED | OUTPATIENT
Start: 2022-01-14 | End: 2022-01-14 | Stop reason: SDUPTHER

## 2022-01-14 RX ORDER — LANOLIN 100 %
OINTMENT (GRAM) TOPICAL PRN
Status: DISCONTINUED | OUTPATIENT
Start: 2022-01-14 | End: 2022-01-15 | Stop reason: HOSPADM

## 2022-01-14 RX ORDER — DIPHENHYDRAMINE HYDROCHLORIDE 50 MG/ML
25 INJECTION INTRAMUSCULAR; INTRAVENOUS EVERY 4 HOURS PRN
Status: DISCONTINUED | OUTPATIENT
Start: 2022-01-14 | End: 2022-01-15 | Stop reason: HOSPADM

## 2022-01-14 RX ORDER — DOCUSATE SODIUM 100 MG/1
100 CAPSULE, LIQUID FILLED ORAL 2 TIMES DAILY
Status: DISCONTINUED | OUTPATIENT
Start: 2022-01-14 | End: 2022-01-15 | Stop reason: HOSPADM

## 2022-01-14 RX ADMIN — AMPICILLIN SODIUM 1000 MG: 1 INJECTION, POWDER, FOR SOLUTION INTRAMUSCULAR; INTRAVENOUS at 22:35

## 2022-01-14 RX ADMIN — SODIUM CHLORIDE, POTASSIUM CHLORIDE, SODIUM LACTATE AND CALCIUM CHLORIDE: 600; 310; 30; 20 INJECTION, SOLUTION INTRAVENOUS at 08:20

## 2022-01-14 RX ADMIN — Medication 166.7 ML: at 22:49

## 2022-01-14 RX ADMIN — SODIUM CHLORIDE, POTASSIUM CHLORIDE, SODIUM LACTATE AND CALCIUM CHLORIDE: 600; 310; 30; 20 INJECTION, SOLUTION INTRAVENOUS at 13:53

## 2022-01-14 RX ADMIN — ONDANSETRON 4 MG: 2 INJECTION INTRAMUSCULAR; INTRAVENOUS at 19:42

## 2022-01-14 RX ADMIN — SODIUM CHLORIDE, POTASSIUM CHLORIDE, SODIUM LACTATE AND CALCIUM CHLORIDE: 600; 310; 30; 20 INJECTION, SOLUTION INTRAVENOUS at 21:56

## 2022-01-14 RX ADMIN — Medication 15 ML/HR: at 13:57

## 2022-01-14 RX ADMIN — Medication 1 MILLI-UNITS/MIN: at 09:29

## 2022-01-14 RX ADMIN — Medication 87.3 MILLI-UNITS/MIN: at 23:06

## 2022-01-14 RX ADMIN — SODIUM CHLORIDE, POTASSIUM CHLORIDE, SODIUM LACTATE AND CALCIUM CHLORIDE 1000 ML: 600; 310; 30; 20 INJECTION, SOLUTION INTRAVENOUS at 13:00

## 2022-01-14 RX ADMIN — BUPIVACAINE HYDROCHLORIDE 7 ML: 2.5 INJECTION, SOLUTION EPIDURAL; INFILTRATION; INTRACAUDAL; PERINEURAL at 13:50

## 2022-01-14 RX ADMIN — BUPIVACAINE HYDROCHLORIDE 5 ML: 5 INJECTION, SOLUTION EPIDURAL; INTRACAUDAL; PERINEURAL at 17:42

## 2022-01-14 ASSESSMENT — PAIN DESCRIPTION - DESCRIPTORS
DESCRIPTORS: CRAMPING
DESCRIPTORS: DULL
DESCRIPTORS: DULL;DISCOMFORT
DESCRIPTORS: DULL;DISCOMFORT
DESCRIPTORS: CRAMPING
DESCRIPTORS: ACHING;BURNING;CRAMPING;DISCOMFORT
DESCRIPTORS: ACHING;CRAMPING;DISCOMFORT
DESCRIPTORS: DULL;CRAMPING

## 2022-01-14 NOTE — PLAN OF CARE
Problem: Airway Clearance - Ineffective  Goal: Achieve or maintain patent airway  Outcome: Ongoing     Problem: Gas Exchange - Impaired  Goal: Absence of hypoxia  Outcome: Ongoing  Goal: Promote optimal lung function  Outcome: Ongoing     Problem: Breathing Pattern - Ineffective  Goal: Ability to achieve and maintain a regular respiratory rate  Outcome: Ongoing     Problem:  Body Temperature -  Risk of, Imbalanced  Goal: Ability to maintain a body temperature within defined limits  Outcome: Ongoing  Goal: Will regain or maintain usual level of consciousness  Outcome: Ongoing  Goal: Complications related to the disease process, condition or treatment will be avoided or minimized  Outcome: Ongoing     Problem: Isolation Precautions - Risk of Spread of Infection  Goal: Prevent transmission of infection  Outcome: Ongoing     Problem: Nutrition Deficits  Goal: Optimize nutritional status  Outcome: Ongoing     Problem: Risk for Fluid Volume Deficit  Goal: Maintain normal heart rhythm  Outcome: Ongoing  Goal: Maintain absence of muscle cramping  Outcome: Ongoing  Goal: Maintain normal serum potassium, sodium, calcium, phosphorus, and pH  Outcome: Ongoing     Problem: Loneliness or Risk for Loneliness  Goal: Demonstrate positive use of time alone when socialization is not possible  Outcome: Ongoing     Problem: Fatigue  Goal: Verbalize increase energy and improved vitality  Outcome: Ongoing     Problem: Patient Education: Go to Patient Education Activity  Goal: Patient/Family Education  Outcome: Ongoing     Problem: Anxiety:  Goal: Level of anxiety will decrease  Description: Level of anxiety will decrease  Outcome: Ongoing     Problem: Breathing Pattern - Ineffective:  Goal: Able to breathe comfortably  Description: Able to breathe comfortably  Outcome: Ongoing     Problem: Fluid Volume - Imbalance:  Goal: Absence of imbalanced fluid volume signs and symptoms  Description: Absence of imbalanced fluid volume signs and symptoms  Outcome: Ongoing  Goal: Absence of intrapartum hemorrhage signs and symptoms  Description: Absence of intrapartum hemorrhage signs and symptoms  Outcome: Ongoing     Problem: Infection - Intrapartum Infection:  Goal: Will show no infection signs and symptoms  Description: Will show no infection signs and symptoms  Outcome: Ongoing     Problem: Labor Process - Prolonged:  Goal: Labor progression, first stage, within specified pattern  Description: Labor progression, first stage, within specified pattern  Outcome: Ongoing  Goal: Labor progession, second stage, within specified pattern  Description: Labor progession, second stage, within specified pattern  Outcome: Ongoing  Goal: Uterine contractions within specified parameters  Description: Uterine contractions within specified parameters  Outcome: Ongoing     Problem:  Screening:  Goal: Ability to make informed decisions regarding treatment has improved  Description: Ability to make informed decisions regarding treatment has improved  Outcome: Ongoing     Problem: Pain - Acute:  Goal: Pain level will decrease  Description: Pain level will decrease  Outcome: Ongoing  Goal: Able to cope with pain  Description: Able to cope with pain  Outcome: Ongoing     Problem: Tissue Perfusion - Uteroplacental, Altered:  Goal: Absence of abnormal fetal heart rate pattern  Description: Absence of abnormal fetal heart rate pattern  Outcome: Ongoing     Problem: Urinary Retention:  Goal: Experiences of bladder distention will decrease  Description: Experiences of bladder distention will decrease  Outcome: Ongoing  Goal: Urinary elimination within specified parameters  Description: Urinary elimination within specified parameters  Outcome: Ongoing

## 2022-01-14 NOTE — PROGRESS NOTES
Department of Obstetrics and Gynecology  Labor and Delivery   Attending Progress Note      SUBJECTIVE:  Pt feeling occasional contractions    OBJECTIVE:      Fetal heart rate:       Baseline Heart Rate:  140        Accelerations:  present       Long Term Variability:  moderate       Decelerations:  absent         Contraction frequency: 6 minutes    Membranes:  AROM performed for clear fluid    Cervix:         Dilation:  3 cm         Effacement:  75%         Station:  -1         Position:  anterior             ASSESSMENT & PLAN:    Continue pitocin induction.

## 2022-01-14 NOTE — ANESTHESIA PRE PROCEDURE
Department of Anesthesiology  Preprocedure Note       Name:  Gayatri Barney   Age:  42 y.o.  :  1980                                          MRN:  [id number]         Date:  2022      Surgeon: * No surgeons listed *    Procedure: * No procedures listed *    Medications prior to admission:   Prior to Admission medications    Medication Sig Start Date End Date Taking?  Authorizing Provider   Prenatal MV-Min-Fe Fum-FA-DHA (PRENATAL 1 PO) Take 1 tablet by mouth   Yes Historical Provider, MD       Current medications:    Current Facility-Administered Medications   Medication Dose Route Frequency Provider Last Rate Last Admin    lactated ringers infusion   IntraVENous Continuous Yovana Hickman  mL/hr at 22 0820 New Bag at 22 0820    lactated ringers bolus  500 mL IntraVENous PRN Yovana Hickman MD        Or    lactated ringers bolus  1,000 mL IntraVENous PRN Yovana Hickman MD        sodium chloride flush 0.9 % injection 5-40 mL  5-40 mL IntraVENous 2 times per day Yovana Hickman MD        sodium chloride flush 0.9 % injection 5-40 mL  5-40 mL IntraVENous PRN Yovana Hickman MD        0.9 % sodium chloride infusion  25 mL IntraVENous PRN Yovana Hickman MD        ondansetron (ZOFRAN) injection 4 mg  4 mg IntraVENous Q6H PRN Yovana Hickman MD        acetaminophen (TYLENOL) tablet 650 mg  650 mg Oral Q4H PRN Yovana Hickman MD        benzocaine-menthol (DERMOPLAST) 20-0.5 % spray   Topical PRN Yovana Hickman MD        docusate sodium (COLACE) capsule 100 mg  100 mg Oral BID Yovana Hickman MD        butorphanol (STADOL) injection 1 mg  1 mg IntraVENous Q3H PRN Yovana Hickman MD        oxytocin (PITOCIN) 30 units in 500 mL infusion  1-20 sloane-units/min IntraVENous Continuous Yovana Hickman MD 6 mL/hr at 22 1200 6 sloane-units/min at 22 1200    diphenhydrAMINE (BENADRYL) injection 25 mg  25 mg IntraVENous Q4H PRN Yovana Hickman MD        terbutaline (BRETHINE) injection 0.25 mg  0.25 mg SubCUTAneous Once Yovana Hickman MD        methylergonovine (METHERGINE) injection 200 mcg  200 mcg IntraMUSCular PRN Yovana Hickman MD        carboprost (HEMABATE) injection 250 mcg  250 mcg IntraMUSCular PRN Yovana Hickman MD        miSOPROStol (CYTOTEC) tablet 800 mcg  800 mcg Rectal PRN Yovana Hickman MD           Allergies: Allergies   Allergen Reactions    Vicodin [Hydrocodone-Acetaminophen] Itching       Problem List:    Patient Active Problem List   Diagnosis Code    Anxiety F41.9    Depression F32. A    IBS (irritable bowel syndrome) K58.9    Elderly multigravida in third trimester O09.523    Vaginal delivery O80    39 weeks gestation of pregnancy Z3A.39    Term pregnancy Z34.90    Vaginal delivery O80    Normal labor O80, Z37.9       Past Medical History:        Diagnosis Date    Anxiety     no meds    ASCUS on Pap smear 6/08    nl 12/08    IBS (irritable bowel syndrome)     Postpartum depression     last two pregnancies       Past Surgical History:        Procedure Laterality Date    DILATION AND CURETTAGE OF UTERUS  6/06    TONSILLECTOMY  1 y/o    WISDOM TOOTH EXTRACTION         Social History:    Social History     Tobacco Use    Smoking status: Never Smoker    Smokeless tobacco: Never Used   Substance Use Topics    Alcohol use:  No                                Counseling given: Not Answered      Vital Signs (Current):   Vitals:    01/14/22 1038 01/14/22 1128 01/14/22 1203 01/14/22 1300   BP: 121/80 123/78 123/86 126/80   Pulse: 79 83 86 83   Resp: 18 18 16 16   Temp: 37.2 °C (98.9 °F)  36.9 °C (98.5 °F)    TempSrc: Oral  Oral    Weight:       Height:                                                  BP Readings from Last 3 Encounters:   01/14/22 126/80   06/13/19 (!) 143/80   12/07/17 114/64       NPO Status:                                                                                 BMI:   Wt Readings from Last 3 Encounters:   01/14/22 148 lb (67.1 kg)   06/11/19 154 lb (69.9 kg)   12/04/17 152 lb (68.9 kg)     Body mass index is 28.9 kg/m². CBC:   Lab Results   Component Value Date    WBC 8.0 01/14/2022    RBC 4.11 01/14/2022    HGB 11.5 01/14/2022    HCT 34.6 01/14/2022    MCV 84.0 01/14/2022    RDW 14.6 01/14/2022     01/14/2022       CMP:   Lab Results   Component Value Date     01/18/2012    K 4.4 01/18/2012     01/18/2012    CO2 22 01/18/2012    BUN 15 01/18/2012    CREATININE 0.7 01/18/2012    GFRAA >60 01/18/2012    GLUCOSE 89 01/18/2012    CALCIUM 9.1 01/18/2012       POC Tests: No results for input(s): POCGLU, POCNA, POCK, POCCL, POCBUN, POCHEMO, POCHCT in the last 72 hours. Coags: No results found for: PROTIME, INR, APTT    HCG (If Applicable): No results found for: PREGTESTUR, PREGSERUM, HCG, HCGQUANT     ABGs: No results found for: PHART, PO2ART, MZO4SBK, NCF7LFS, BEART, T2ZBHMAQ     Type & Screen (If Applicable):  No results found for: LABABO, LABRH    Drug/Infectious Status (If Applicable):  Lab Results   Component Value Date    HEPCAB Non-Reactive (Negative) 01/18/2012       COVID-19 Screening (If Applicable):   Lab Results   Component Value Date    COVID19 Detected 01/10/2022           Anesthesia Evaluation  Patient summary reviewed and Nursing notes reviewed no history of anesthetic complications:   Airway: Mallampati: II     Neck ROM: full   Dental: normal exam         Pulmonary:Negative Pulmonary ROS and normal exam                               Cardiovascular:Negative CV ROS                      Neuro/Psych:   Negative Neuro/Psych ROS  (+) depression/anxiety             GI/Hepatic/Renal: Neg GI/Hepatic/Renal ROS            Endo/Other: Negative Endo/Other ROS                    Abdominal:             Vascular: Other Findings:             Anesthesia Plan      epidural     ASA 2 - emergent     (I discussed with the patient the risks and benefits of labor epidural placement. All questions were answered the patient agrees with the plan. Recent Vitals:  --------------------            01/14/22               1300     --------------------   BP:       126/80     Pulse:      83       Resp:       16       Temp:               --------------------  Body mass index is 28.9 kg/m². Social History    Tobacco Use      Smoking status: Never Smoker      Smokeless tobacco: Never Used      LABS:    CBC  Lab Results       Component                Value               Date/Time                  WBC                      8.0                 01/14/2022 08:10 AM        HGB                      11.5 (L)            01/14/2022 08:10 AM        HCT                      34.6 (L)            01/14/2022 08:10 AM        PLT                      238                 01/14/2022 08:10 AM   RENAL  Lab Results       Component                Value               Date/Time                  NA                       140                 01/18/2012 11:10 AM        K                        4.4                 01/18/2012 11:10 AM        CL                       108                 01/18/2012 11:10 AM        CO2                      22                  01/18/2012 11:10 AM        BUN                      15                  01/18/2012 11:10 AM        CREATININE               0.7                 01/18/2012 11:10 AM        GLUCOSE                  89                  01/18/2012 11:10 AM   COAGS  No results found for: PROTIME, INR, APTT)        Anesthetic plan and risks discussed with patient.                       AARON Garcia CRNA   1/14/2022

## 2022-01-14 NOTE — H&P
Department of Obstetrics and Gynecology   Obstetrics History and Physical    CHIEF COMPLAINT: for induction    HISTORY OF PRESENT ILLNESS:      The patient is a 42 y.o. female at 36w3d. OB History          9    Para   6    Term   6       0    AB   2    Living   6         SAB   2    IAB   0    Ectopic   0    Molar   0    Multiple   0    Live Births   6            Patient presents with a chief complaint as above and is being admitted for induction. Denies complaints. Tested positive for COVID on 1/10. She reports she was never symptomatic.     Estimated Due Date: Estimated Date of Delivery: 22    PRENATAL CARE:  AMA, surrogate pregnancy      PAST OB HISTORY:  OB History          9    Para   6    Term   6       0    AB   2    Living   6         SAB   2    IAB   0    Ectopic   0    Molar   0    Multiple   0    Live Births   6            FAVD x 1,  x 5, biggest baby: 8.11    Past Medical History:        Diagnosis Date    Anxiety     no meds    ASCUS on Pap smear     nl     IBS (irritable bowel syndrome)     Postpartum depression     last two pregnancies     Past Surgical History:        Procedure Laterality Date    DILATION AND CURETTAGE OF UTERUS      TONSILLECTOMY  2 y/o    WISDOM TOOTH EXTRACTION       Allergies:  Vicodin [hydrocodone-acetaminophen]    Social History:    Social History     Socioeconomic History    Marital status:      Spouse name: Not on file    Number of children: 4    Years of education: Not on file    Highest education level: Not on file   Occupational History    Occupation:    Tobacco Use    Smoking status: Never Smoker    Smokeless tobacco: Never Used   Substance and Sexual Activity    Alcohol use: No    Drug use: No    Sexual activity: Yes     Partners: Male   Other Topics Concern    Not on file   Social History Narrative    Not on file     Social Determinants of Health     Financial Resource Strain:     Difficulty of Paying Living Expenses: Not on file   Food Insecurity:     Worried About [address] in the Last Year: Not on file    Ran Out of Food in the Last Year: Not on file   Transportation Needs:     Lack of Transportation (Medical): Not on file    Lack of Transportation (Non-Medical):  Not on file   Physical Activity:     Days of Exercise per Week: Not on file    Minutes of Exercise per Session: Not on file   Stress:     Feeling of Stress : Not on file   Social Connections:     Frequency of Communication with Friends and Family: Not on file    Frequency of Social Gatherings with Friends and Family: Not on file    Attends Congregational Services: Not on file    Active Member of [address] [place] [address] or Organizations: Not on file    Attends Club or Organization Meetings: Not on file    Marital Status: Not on file   Intimate Partner Violence:     Fear of Current or Ex-Partner: Not on file    Emotionally Abused: Not on file    Physically Abused: Not on file    Sexually Abused: Not on file   Housing Stability:     Unable to Pay for Housing in the Last Year: Not on file    Number of [place] in the Last Year: Not on file    Unstable Housing in the Last Year: Not on file     Family History:       Problem Relation Age of Onset    Mental Illness Mother         bipolar    Substance Abuse Mother         etoh    Mental Illness Maternal Aunt         schizophrenia    Diabetes Paternal Uncle     Diabetes Paternal Grandmother     Heart Disease Neg Hx     High Blood Pressure Neg Hx      Medications Prior to Admission:  Medications Prior to Admission: ibuprofen (ADVIL;MOTRIN) 800 MG tablet, Take 1 tablet by mouth every 8 hours as needed for Pain (Pain level 1 - 10)  valACYclovir (VALTREX) 1 g tablet, Take 1 tablet by mouth daily  Prenatal MV-Min-Fe Fum-FA-DHA (PRENATAL 1 PO), Take 1 tablet by mouth    REVIEW OF SYSTEMS:    Denies fever, chills, dizziness, CP, N/V/D, constipation, dysuria, blood in the urine or stool    PHYSICAL EXAM:  Vitals:    01/14/22 0828   BP: (!) 147/85   Pulse: 85   Resp: 18   Temp: 98.6 °F (37 °C)   TempSrc: Oral   Weight: 148 lb (67.1 kg)   Height: 5' (1.524 m)     General appearance:  awake, alert, cooperative, no apparent distress, and appears stated age  Neurologic:  Awake, alert, oriented to name, place and time.     Lungs:  No increased work of breathing, good air exchange  Abdomen:  Soft, non tender, gravid, consistent with her gestational age, EFW by Leopold's maneuver was 3800  Fetal heart rate:  150s bpm, moderate variability +accels, - decels  Pelvis:  Adequate pelvis  Cervix: per RN 1/80/-21  Contraction frequency:  irregular  Membranes:  Intact    Labs: CBC with Differential:    Lab Results   Component Value Date    WBC 11.4 06/13/2019    RBC 3.74 06/13/2019    HGB 11.6 06/13/2019    HCT 33.9 06/13/2019     06/13/2019    MCV 90.8 06/13/2019    MCH 31.1 06/13/2019    MCHC 34.2 06/13/2019    RDW 15.4 06/13/2019    SEGSPCT 83.6 06/08/2011    LYMPHOPCT 14.3 06/11/2019    MONOPCT 6.3 06/11/2019    EOSPCT 0.2 06/08/2011    BASOPCT 0.3 06/11/2019    MONOSABS 0.8 06/11/2019    LYMPHSABS 1.8 06/11/2019    EOSABS 0.1 06/11/2019    BASOSABS 0.0 06/11/2019    DIFFTYPE Auto 06/08/2011       ASSESSMENT AND PLAN:    Labor: Admit, anticipate normal delivery, routine labor orders  Fetus: Reassuring  GBS: negative  Other: IV hydration and antiemetics, pitocin, R, B, A and possible complications discussed    Mary Napoles MD

## 2022-01-14 NOTE — PROGRESS NOTES
Patient verbalized permission to share with Vanna Mak that she tested positive for Covid. Verbalized consent to share all medical information with Mr. An Ray such as dilation, contraction pattern, pitocin administration, vital signs and all medical care. Mr. An Ray was informed that Mrs. Ortega tested positive for Covid. Neonatologist into discuss care of infant, and answer questions.

## 2022-01-14 NOTE — PROGRESS NOTES
Patient arrive ambulatory to [facility] for scheduled induction. Patient is a surrogate. Patient oriented to room 371. Bed low position and locked. Urine obtained. Admission consents signed, admission assessment completed. Plan of care disucssed. Patient verbalized understanding. Patient denies contractions, vaginal bleeding, SROM or decreased fetal movement. Patient state she tested positive for covid 01/10. Patient denies any symptoms. Patient placed in isolation. Droplet isolation.

## 2022-01-14 NOTE — ANESTHESIA PROCEDURE NOTES
Epidural Block    Patient location during procedure: OB  Reason for block: labor epidural  Staffing  Resident/CRNA: Gwen Samano APRN - CRNA  Preanesthetic Checklist  Completed: patient identified, IV checked, risks and benefits discussed, monitors and equipment checked, pre-op evaluation, timeout performed, anesthesia consent given, oxygen available and patient being monitored  Epidural  Patient position: sitting  Prep: ChloraPrep and site prepped and draped  Patient monitoring: continuous pulse ox and frequent blood pressure checks  Approach: midline  Location: lumbar (1-5)  Injection technique: JAXON saline  Provider prep: sterile gloves and mask  Needle  Needle type: Tuohy   Needle gauge: 17 G  Needle length: 3.5 in  Needle insertion depth: 4.5 cm  Catheter type: side hole  Catheter size: 19 G  Catheter at skin depth: 11 cm  Test dose: negative  Assessment  Hemodynamics: stable  Attempts: 1  Additional Notes  Pt prepped and draped in sterile fashion. Skin wheal with 1% lidocaine. JAXON with 3 cc NS, 25g spinal needle inserted per lila. Clear CSF visualized in hub. Needle withdrawn and catheter threaded. Negative test dose 3cc 1.5% Lidocaine with Epinephrine. Sterile dressing applied.

## 2022-01-15 VITALS
SYSTOLIC BLOOD PRESSURE: 132 MMHG | WEIGHT: 148 LBS | TEMPERATURE: 98.2 F | DIASTOLIC BLOOD PRESSURE: 72 MMHG | OXYGEN SATURATION: 100 % | RESPIRATION RATE: 16 BRPM | BODY MASS INDEX: 29.06 KG/M2 | HEART RATE: 62 BPM | HEIGHT: 60 IN

## 2022-01-15 LAB
BASOPHILS ABSOLUTE: 0 K/UL (ref 0–0.2)
BASOPHILS RELATIVE PERCENT: 0.1 %
EOSINOPHILS ABSOLUTE: 0 K/UL (ref 0–0.6)
EOSINOPHILS RELATIVE PERCENT: 0 %
HCT VFR BLD CALC: 29.3 % (ref 36–48)
HEMOGLOBIN: 9.7 G/DL (ref 12–16)
LYMPHOCYTES ABSOLUTE: 1.8 K/UL (ref 1–5.1)
LYMPHOCYTES RELATIVE PERCENT: 8.3 %
MCH RBC QN AUTO: 28 PG (ref 26–34)
MCHC RBC AUTO-ENTMCNC: 32.9 G/DL (ref 31–36)
MCV RBC AUTO: 85.1 FL (ref 80–100)
MONOCYTES ABSOLUTE: 1.1 K/UL (ref 0–1.3)
MONOCYTES RELATIVE PERCENT: 4.9 %
NEUTROPHILS ABSOLUTE: 19.3 K/UL (ref 1.7–7.7)
NEUTROPHILS RELATIVE PERCENT: 86.7 %
PDW BLD-RTO: 14.7 % (ref 12.4–15.4)
PLATELET # BLD: 213 K/UL (ref 135–450)
PMV BLD AUTO: 8.5 FL (ref 5–10.5)
RBC # BLD: 3.44 M/UL (ref 4–5.2)
TOTAL SYPHILLIS IGG/IGM: NORMAL
WBC # BLD: 22.3 K/UL (ref 4–11)

## 2022-01-15 PROCEDURE — 85025 COMPLETE CBC W/AUTO DIFF WBC: CPT

## 2022-01-15 PROCEDURE — 36415 COLL VENOUS BLD VENIPUNCTURE: CPT

## 2022-01-15 PROCEDURE — 6370000000 HC RX 637 (ALT 250 FOR IP)

## 2022-01-15 PROCEDURE — 6370000000 HC RX 637 (ALT 250 FOR IP): Performed by: STUDENT IN AN ORGANIZED HEALTH CARE EDUCATION/TRAINING PROGRAM

## 2022-01-15 PROCEDURE — 2580000003 HC RX 258: Performed by: STUDENT IN AN ORGANIZED HEALTH CARE EDUCATION/TRAINING PROGRAM

## 2022-01-15 PROCEDURE — 2500000003 HC RX 250 WO HCPCS: Performed by: STUDENT IN AN ORGANIZED HEALTH CARE EDUCATION/TRAINING PROGRAM

## 2022-01-15 PROCEDURE — 6360000002 HC RX W HCPCS: Performed by: STUDENT IN AN ORGANIZED HEALTH CARE EDUCATION/TRAINING PROGRAM

## 2022-01-15 RX ORDER — IBUPROFEN 800 MG/1
TABLET ORAL
Status: COMPLETED
Start: 2022-01-15 | End: 2022-01-15

## 2022-01-15 RX ORDER — IBUPROFEN 800 MG/1
800 TABLET ORAL EVERY 8 HOURS PRN
Status: DISCONTINUED | OUTPATIENT
Start: 2022-01-15 | End: 2022-01-15 | Stop reason: HOSPADM

## 2022-01-15 RX ADMIN — WITCH HAZEL 40 EACH: 500 SOLUTION RECTAL; TOPICAL at 00:49

## 2022-01-15 RX ADMIN — IBUPROFEN 800 MG: 800 TABLET, FILM COATED ORAL at 00:51

## 2022-01-15 RX ADMIN — BENZOCAINE AND LEVOMENTHOL: 200; 5 SPRAY TOPICAL at 00:45

## 2022-01-15 RX ADMIN — IBUPROFEN 800 MG: 800 TABLET, FILM COATED ORAL at 09:11

## 2022-01-15 RX ADMIN — GENTAMICIN SULFATE 100.8 MG: 40 INJECTION, SOLUTION INTRAMUSCULAR; INTRAVENOUS at 06:22

## 2022-01-15 RX ADMIN — Medication 10 ML: at 09:11

## 2022-01-15 RX ADMIN — DOCUSATE SODIUM 100 MG: 100 CAPSULE ORAL at 09:11

## 2022-01-15 RX ADMIN — CLINDAMYCIN PHOSPHATE 900 MG: 900 INJECTION, SOLUTION INTRAVENOUS at 05:14

## 2022-01-15 RX ADMIN — FERROUS SULFATE TAB 325 MG (65 MG ELEMENTAL FE) 325 MG: 325 (65 FE) TAB at 09:11

## 2022-01-15 ASSESSMENT — PAIN SCALES - GENERAL
PAINLEVEL_OUTOF10: 1
PAINLEVEL_OUTOF10: 0

## 2022-01-15 NOTE — PLAN OF CARE
Problem: Airway Clearance - Ineffective  Goal: Achieve or maintain patent airway  1/15/2022 1427 by Lino Garcia RN  Outcome: Met This Shift  1/15/2022 0038 by Mauricio Vega RN  Outcome: Ongoing     Problem: Gas Exchange - Impaired  Goal: Absence of hypoxia  1/15/2022 1427 by Lino Garcia RN  Outcome: Met This Shift  1/15/2022 0038 by Mauricio Vega RN  Outcome: Ongoing  Goal: Promote optimal lung function  1/15/2022 1427 by Lino Garcia RN  Outcome: Met This Shift  1/15/2022 0038 by Mauricio Vega RN  Outcome: Ongoing     Problem: Breathing Pattern - Ineffective  Goal: Ability to achieve and maintain a regular respiratory rate  1/15/2022 1427 by Lino Garcia RN  Outcome: Met This Shift  1/15/2022 0038 by Mauricio Vega RN  Outcome: Ongoing     Problem:  Body Temperature -  Risk of, Imbalanced  Goal: Ability to maintain a body temperature within defined limits  1/15/2022 1427 by Lino Garcia RN  Outcome: Met This Shift  1/15/2022 0038 by Mauricio Vega RN  Outcome: Ongoing  Goal: Will regain or maintain usual level of consciousness  1/15/2022 1427 by Lino Garcia RN  Outcome: Met This Shift  1/15/2022 0038 by Mauricio Vega RN  Outcome: Ongoing  Goal: Complications related to the disease process, condition or treatment will be avoided or minimized  1/15/2022 1427 by Lino Garcia RN  Outcome: Met This Shift  1/15/2022 0038 by Mauricio Vega RN  Outcome: Ongoing     Problem: Isolation Precautions - Risk of Spread of Infection  Goal: Prevent transmission of infection  1/15/2022 1427 by Lino Garcia RN  Outcome: Met This Shift  1/15/2022 0038 by Mauricio Vega RN  Outcome: Ongoing     Problem: Nutrition Deficits  Goal: Optimize nutritional status  1/15/2022 1427 by Lino Garcia RN  Outcome: Met This Shift  1/15/2022 0038 by Mauricio Vega RN  Outcome: Ongoing     Problem: Risk for Fluid Volume Deficit  Goal: Maintain normal heart rhythm  1/15/2022 1427 by Yoni Cruz RN  Outcome: Met This Shift  1/15/2022 0038 by Nolberto Stephen RN  Outcome: Ongoing  Goal: Maintain absence of muscle cramping  1/15/2022 1427 by Yoni Cruz RN  Outcome: Met This Shift  1/15/2022 0038 by Nolberto Stephen RN  Outcome: Ongoing  Goal: Maintain normal serum potassium, sodium, calcium, phosphorus, and pH  1/15/2022 1427 by Yoni Cruz RN  Outcome: Met This Shift  1/15/2022 0038 by Nolberto Stephen RN  Outcome: Ongoing     Problem: Loneliness or Risk for Loneliness  Goal: Demonstrate positive use of time alone when socialization is not possible  1/15/2022 1427 by Yoni Cruz RN  Outcome: Met This Shift  1/15/2022 0038 by Nolberto Stephen RN  Outcome: Ongoing     Problem: Fatigue  Goal: Verbalize increase energy and improved vitality  1/15/2022 1427 by Yoni Cruz RN  Outcome: Met This Shift  1/15/2022 0038 by Nolberto Stephen RN  Outcome: Ongoing     Problem: Patient Education: Go to Patient Education Activity  Goal: Patient/Family Education  1/15/2022 1427 by Yoni Cruz RN  Outcome: Met This Shift  1/15/2022 0038 by Nolberto Stephen RN  Outcome: Ongoing     Problem: Anxiety:  Goal: Level of anxiety will decrease  Description: Level of anxiety will decrease  1/15/2022 1427 by Yoni Cruz RN  Outcome: Met This Shift  1/15/2022 0038 by Nolberto Stephen RN  Outcome: Ongoing     Problem: Breathing Pattern - Ineffective:  Goal: Able to breathe comfortably  Description: Able to breathe comfortably  1/15/2022 1427 by Yoni Cruz RN  Outcome: Met This Shift  1/15/2022 0038 by Nolberto Stephen RN  Outcome: Ongoing     Problem: Fluid Volume - Imbalance:  Goal: Absence of imbalanced fluid volume signs and symptoms  Description: Absence of imbalanced fluid volume signs and symptoms  1/15/2022 1427 by Yoni Cruz RN  Outcome: Met This Shift  1/15/2022 0038 by Michelet Mijares RN  Outcome: Ongoing  Goal: Absence of intrapartum hemorrhage signs and symptoms  Description: Absence of intrapartum hemorrhage signs and symptoms  1/15/2022 1427 by Sandee Aviles RN  Outcome: Met This Shift  1/15/2022 0038 by Michelet Mijares RN  Outcome: Ongoing     Problem: Infection - Intrapartum Infection:  Goal: Will show no infection signs and symptoms  Description: Will show no infection signs and symptoms  1/15/2022 1427 by Sandee Aviles RN  Outcome: Met This Shift  1/15/2022 0038 by Michelet Mijares RN  Outcome: Ongoing     Problem: Labor Process - Prolonged:  Goal: Labor progression, first stage, within specified pattern  Description: Labor progression, first stage, within specified pattern  1/15/2022 1427 by Sandee Aviles RN  Outcome: Met This Shift  1/15/2022 0038 by Michelet Mijares RN  Outcome: Ongoing  Goal: Labor progession, second stage, within specified pattern  Description: Labor progession, second stage, within specified pattern  1/15/2022 1427 by Sandee Aviles RN  Outcome: Met This Shift  1/15/2022 0038 by Michelet Mijares RN  Outcome: Ongoing  Goal: Uterine contractions within specified parameters  Description: Uterine contractions within specified parameters  1/15/2022 1427 by Sandee Aviles RN  Outcome: Met This Shift  1/15/2022 0038 by Michelet Mijares RN  Outcome: Ongoing     Problem:  Screening:  Goal: Ability to make informed decisions regarding treatment has improved  Description: Ability to make informed decisions regarding treatment has improved  1/15/2022 1427 by Sandee Aviles RN  Outcome: Met This Shift  1/15/2022 0038 by Michelet Mijares RN  Outcome: Ongoing     Problem: Pain - Acute:  Goal: Pain level will decrease  Description: Pain level will decrease  1/15/2022 1427 by Sandee Aviles RN  Outcome: Met This Shift  1/15/2022 0038 by Michelet Mijares RN  Outcome: Ongoing  Goal: Able to cope with pain  Description: Able to cope with pain  1/15/2022 1427 by Dwight Moore RN  Outcome: Met This Shift  1/15/2022 0038 by Hoa Markham RN  Outcome: Ongoing     Problem: Tissue Perfusion - Uteroplacental, Altered:  Goal: Absence of abnormal fetal heart rate pattern  Description: Absence of abnormal fetal heart rate pattern  1/15/2022 1427 by Dwight Moore RN  Outcome: Met This Shift  1/15/2022 0038 by Hoa Markham RN  Outcome: Ongoing     Problem: Urinary Retention:  Goal: Experiences of bladder distention will decrease  Description: Experiences of bladder distention will decrease  1/15/2022 1427 by Dwight Moore RN  Outcome: Met This Shift  1/15/2022 0038 by Hoa Markham RN  Outcome: Ongoing  Goal: Urinary elimination within specified parameters  Description: Urinary elimination within specified parameters  1/15/2022 1427 by Dwight Moore RN  Outcome: Met This Shift  1/15/2022 0038 by Hoa Markham RN  Outcome: Ongoing     Problem: Pain:  Goal: Pain level will decrease  Description: Pain level will decrease  1/15/2022 1427 by Dwight Moore RN  Outcome: Met This Shift  1/15/2022 0038 by Hoa Markham RN  Outcome: Ongoing  Goal: Control of acute pain  Description: Control of acute pain  1/15/2022 1427 by Dwight Moore RN  Outcome: Met This Shift  1/15/2022 0038 by Hoa Markham RN  Outcome: Ongoing  Goal: Control of chronic pain  Description: Control of chronic pain  1/15/2022 1427 by Dwight Moore RN  Outcome: Met This Shift  1/15/2022 0038 by Hoa Markham RN  Outcome: Ongoing

## 2022-01-15 NOTE — PROGRESS NOTES
Dr. Dalton Bertrand at bedside to evaluate patient. Jim at bedside.  Dr. Dalton Bertrand informed of patient fever 100.7

## 2022-01-15 NOTE — PROGRESS NOTES
Bedside report from [facility] for safe transfer of care. Patient is alert, oriented and comfortable with epidural re-dose.  at bedside for labor support. LFA #18 PIV in place, LR infusing at 125 ml/hr and pitocin infusing at 10 milliunits/minute. Room equipment checked and prepared for delivery.

## 2022-01-15 NOTE — PROGRESS NOTES
Pt verbalized understanding of verbal and written discharge instructions and denies having questions at this time. Pt left OB unit ambulatory,  in stable condition, accompanied by family.

## 2022-01-15 NOTE — PROGRESS NOTES
Department of Obstetrics and Gynecology  Labor and Delivery  Attending Post Partum Progress Note      SUBJECTIVE:  Pt feels well. Right leg still a little numb. No probs. Ambulating or voiding. Lochia normal. Wants to go home later today. OBJECTIVE:      Vitals:  /76   Pulse 54   Temp 97.9 °F (36.6 °C) (Oral)   Resp 16   Ht 5' (1.524 m)   Wt 148 lb (67.1 kg)   LMP 04/15/2021   SpO2 100%   Breastfeeding Unknown   BMI 28.90 kg/m²     ABDOMEN:  FFNT  GENITAL/URINARY:  Deferred  EXT:  NT    DATA:    CBC:    Lab Results   Component Value Date    WBC 22.3 01/15/2022    RBC 3.44 01/15/2022    HGB 9.7 01/15/2022    HCT 29.3 01/15/2022    MCV 85.1 01/15/2022    RDW 14.7 01/15/2022     01/15/2022       ASSESSMENT & PLAN:      IMP:  PPD#1 S/P , doing well. PLAN:  Home. Instructed. Declines Rx. F/U 6 weeks in office.     Norma Dai MD

## 2022-01-15 NOTE — ANESTHESIA POSTPROCEDURE EVALUATION
Department of Anesthesiology  Postprocedure Note    Patient: Chucky Schmitt  MRN: [id number]  YOB: 1980  Date of evaluation: 1/15/2022  Time:  9:06 AM     Procedure Summary     Date: 01/14/22 Room / Location:     Anesthesia Start: 1335 Anesthesia Stop: 2245    Procedure: Labor Analgesia Diagnosis:     Scheduled Providers:  Responsible Provider: Julio Taylor MD    Anesthesia Type: epidural ASA Status: 2 - Emergent          Anesthesia Type: epidural    Joby Phase I: Joby Score: 9    Joby Phase II: Joby Score: 10    Last vitals: Reviewed and per EMR flowsheets. Anesthesia Post Evaluation    Level of consciousness: awake and alert  Nausea & Vomiting: no nausea and no vomiting  Complications: no  Cardiovascular status: hemodynamically stable  Respiratory status: acceptable and room air  Hydration status: stable  Comments: Patient is s/p vaginal delivery with epidural analgesia. Progress notes, flow sheets, labs, and MARs reviewed. No apparent or reported anesthesia complications thus far.

## 2022-01-15 NOTE — PROGRESS NOTES
First time get up to BR w/ assist x 2 RN's. Pt voided 350 cc urine without difficulty. Pt performed tmaeka care per self after instruction. New ice pack, peripad, gown and underwear provided. Comfort pad and new linens on bed. Waffle cushion provided. Pt back to bed w/ out incident, gait steady. Pt tolerated well.

## 2022-01-15 NOTE — L&D DELIVERY NOTE
[facility]   [facility]    Preoperative Diagnosis: SIUP at 39w1d, AMA, surrogate pregnancy, COVID positive    Postoperative Diagnosis: same s/p , chorioamnionitis, shoulder dystocia    Surgeon: Hugo Ivory MD    Anesthesia:  epidural anesthesia    Estimated blood loss:  700ml    Specimen:  Placenta not sent to pathology     Cord blood sent No    Complications:  Shoulder dystocia, cord avulsion requiring manual removal of placenta    Condition:  mother and infant stable in delivery room    Findings: 4593 gram male  in NICKO presentation Apgars 7/9    Details of Procedure: The patient is a 42 y.o. [id number] at 39w1d  who was admitted for induction. She received the following interventions: IV Pitocin induction and ARBOW. She was known to be GBS negative and did not receive antibiotic prophylaxis. Patient did receive and epidural. A fever of 100.7 was noted at 2130 and patient was given ampicillin and gentamicin for chorioamnionitis. The patient progressed to complete and started to push. After pushing for 1 hour with contractions, the fetal head was NICKO and at the perineum. The head was found to not deliver with gentle downward pressure on the head and pushing. The head was noted to retract upon the perineum. A shoulder dystocia was diagnosed. Patient was placed into Matthew and suprapubic pressure was applied. The shoulder did not deliver with these maneuvers. The Fischer maneuver (pressure on the posterior part of the anterior shoulder was applied) and the anterior (right) shoulder was then dislodged from the pubic symphysis. Shoulder dystocia lasted approximately 1 minute. The rest of the body of the infant then delivered. Cord was clamped and cut and infant was handed off to the waiting nurse for evaluation. With gentle downward traction, the umbilical cord avulsed from the placenta.  The placenta was then manually removed from the uterus and the placenta was extracted intact. A raytec was used to wipe the uterine cavity and assure all placental products and membranes had been removed. The perineum and vagina were explored and no lacerations were found. Infant is in room with father stable and performing skin to skin. Mother is in delivery room in stable condition.     Juli Centeno MD

## 2022-02-01 NOTE — DISCHARGE SUMMARY
Obstetrical Discharge Form    Gestational Age: 36w3d    Antepartum complications: AMA    Date of Delivery: 2022      Type of Delivery: vaginal, spontaneous    Delivered By:  Dr. Friedman Wilner:   Information for the patient's :  Robyn Astudillo Pending Jaclyn [[id number]]        Anesthesia: Epidural    Intrapartum complications: Chorio    Postpartum complications: shoulder dystocia    Discharge Medication:      Medication List      ASK your doctor about these medications    PRENATAL 1 PO             Discharge Date: 1/15/2022    Condition on discharge: Stable    Plan:   Follow up in 6 week(s)    Cheng Morales MD

## 2022-05-09 NOTE — TELEPHONE ENCOUNTER
Pt called sd just had baby 12/5/17 . Pt is having pain in neck , going to back and right shoulder .  Per office sending message, pls call pt to advise 66